# Patient Record
Sex: FEMALE | Race: AMERICAN INDIAN OR ALASKA NATIVE
[De-identification: names, ages, dates, MRNs, and addresses within clinical notes are randomized per-mention and may not be internally consistent; named-entity substitution may affect disease eponyms.]

---

## 2017-09-01 ENCOUNTER — HOSPITAL ENCOUNTER (EMERGENCY)
Dept: HOSPITAL 5 - ED | Age: 27
Discharge: HOME | End: 2017-09-01
Payer: MEDICAID

## 2017-09-01 VITALS — DIASTOLIC BLOOD PRESSURE: 77 MMHG | SYSTOLIC BLOOD PRESSURE: 111 MMHG

## 2017-09-01 DIAGNOSIS — K21.9: ICD-10-CM

## 2017-09-01 DIAGNOSIS — Z3A.39: ICD-10-CM

## 2017-09-01 DIAGNOSIS — K64.2: ICD-10-CM

## 2017-09-01 DIAGNOSIS — O22.43: Primary | ICD-10-CM

## 2017-09-01 NOTE — EMERGENCY DEPARTMENT REPORT
ED Female  HPI





- General


Chief complaint: Rectal Pain


Stated complaint: HEMORRIODS


Time Seen by Provider: 09/01/17 19:23


Source: patient


Mode of arrival: Ambulatory


Limitations: No Limitations





- History of Present Illness


Initial comments: 


This is a 26-year-old female at 39 weeks gestation presents to ED complaining 

of rectal pain x today.  She admits pain with sitting on her butt.  Patient 

states she started to feel the pain around 4 AM today.  She reports good fetal 

movement and no vaginal discharge, bleeding or leakage of fluids Patient states 

she has a history of hemorrhoids but has never had one this bad.  She denies 

any blood in the stool. patient states she had a bowel movement today.


She denies fevers/chills/abdominal pain or any other problem





Severity scale (0 -10): 9


Consistency: constant


Improves with: none





- Related Data


 Previous Rx's











 Medication  Instructions  Recorded  Last Taken  Type


 


Sulfamethoxazole/Trimethoprim 1 each PO BID #14 tablet 09/16/16 Unknown Rx





[Bactrim DS TAB]    


 


Acetaminophen [Tylenol] 500 mg PO Q6H #30 tablet 09/01/17 Unknown Rx


 


Cephalexin [Keflex] 500 mg PO BID #12 capsule 09/01/17 Unknown Rx


 


Docusate Sodium [Colace] 100 mg PO BID #30 capsule 09/01/17 Unknown Rx


 


Hydrocortisone [Anucort-HC SUPPOS] 25 mg MS BID #30 supp.rect 09/01/17 Unknown 

Rx











 Allergies











Allergy/AdvReac Type Severity Reaction Status Date / Time


 


No Known Allergies Allergy   Verified 09/16/16 10:25














ED Review of Systems


ROS: 


Stated complaint: HEMORRIODS


Other details as noted in HPI





Constitutional: denies: chills, fever


Eyes: denies: eye pain, eye discharge, vision change


ENT: denies: ear pain, throat pain


Respiratory: denies: cough, shortness of breath, wheezing


Cardiovascular: denies: chest pain, palpitations


Endocrine: no symptoms reported


Gastrointestinal: denies: abdominal pain, nausea, vomiting, diarrhea


Genitourinary: denies: urgency, dysuria, frequency, hematuria, discharge, 

dyspareunia


Musculoskeletal: denies: back pain, joint swelling, arthralgia


Skin: denies: rash, lesions


Neurological: denies: headache, weakness, numbness, paresthesias, confusion


Psychiatric: denies: anxiety, depression


Hematological/Lymphatic: denies: easy bleeding, easy bruising





ED Past Medical Hx





- Past Medical History


Hx Hypertension: Yes (1st baby)


Hx Heart Attack/AMI: No


Hx Congestive Heart Failure: No


Hx Diabetes: No


Hx Deep Vein Thrombosis: No


Hx GERD: Yes


Hx Liver Disease: No


Hx Renal Disease: No


Hx Sickle Cell Disease: No


Hx Seizures: No


Hx Asthma: No


Hx COPD: No


Hx HIV: No





- Surgical History


Additional Surgical History: D&C MAY 2016





- Social History


Smoking Status: Never Smoker


Substance Use Type: None





- Medications


Home Medications: 


 Home Medications











 Medication  Instructions  Recorded  Confirmed  Last Taken  Type


 


Sulfamethoxazole/Trimethoprim 1 each PO BID #14 tablet 09/16/16  Unknown Rx





[Bactrim DS TAB]     


 


Acetaminophen [Tylenol] 500 mg PO Q6H #30 tablet 09/01/17  Unknown Rx


 


Cephalexin [Keflex] 500 mg PO BID #12 capsule 09/01/17  Unknown Rx


 


Docusate Sodium [Colace] 100 mg PO BID #30 capsule 09/01/17  Unknown Rx


 


Hydrocortisone [Anucort-HC SUPPOS] 25 mg MS BID #30 supp.rect 09/01/17  Unknown 

Rx














ED Physical Exam





- General


Limitations: No Limitations


General appearance: alert, in no apparent distress





- Head


Head exam: Present: atraumatic, normocephalic





- Eye


Eye exam: Present: normal appearance





- ENT


ENT exam: Present: mucous membranes moist





- Neck


Neck exam: Present: normal inspection





- Respiratory


Respiratory exam: Present: normal lung sounds bilaterally.  Absent: respiratory 

distress





- Cardiovascular


Cardiovascular Exam: Present: regular rate, normal rhythm.  Absent: systolic 

murmur, diastolic murmur, rubs, gallop





- GI/Abdominal


GI/Abdominal exam: Present: soft, normal bowel sounds





- Rectal


Rectal exam: Present: hemorrhoids (external thrombosed hemorrhoid at 12 oclock 

measuring about 2-3 cm), mass, tenderness





- 


External exam: Present: normal external exam





- Expanded  Exam


  ** Expanded


External exam: Present: normal





- Extremities Exam


Extremities exam: Present: normal inspection





- Back Exam


Back exam: Present: normal inspection





- Neurological Exam


Neurological exam: Present: alert, oriented X3





- Psychiatric


Psychiatric exam: Present: normal affect, normal mood





- Skin


Skin exam: Present: warm, dry, intact, normal color.  Absent: rash





ED Course


 Vital Signs











  09/01/17





  17:43


 


Temperature 98.0 F


 


Pulse Rate 114 H


 


Respiratory 17





Rate 


 


Blood Pressure 115/80


 


O2 Sat by Pulse 100





Oximetry 














ED Medical Decision Making





- Medical Decision Making


26-year-old female presents with acute internal.  3 hemorrhage


ED course: Topical lidocaine was applied.


Patient positioned appropriately, 10cc lidocaine with epinephrine was used as a 

local anesthetic. 


#11 blade scalpel used for single incision. 


hemorrhoid was reducible after about 2ccs of blood passed. 


 Patient reports feeling much better. Procedure tolerated without 

complications. 


 Pt tolerated procedure well.


Discussed with patient to follow-up with gastroenterologist as referred.


Discussed the patient to follow-up with her OB/GYN as scheduled.


Discussed taken Colace for stool softening, and prevent infection and any 

required to further decrease the hemorrhoid








Critical care attestation.: 


If time is entered above; I have spent that time in minutes in the direct care 

of this critically ill patient, excluding procedure time.








ED Disposition


Clinical Impression: 


 Acute hemorrhoid





Hemorrhoid


Qualifiers:


 Hemorrhoid type: third degree Qualified Code(s): K64.2 - Third degree 

hemorrhoids





Disposition: DC-01 TO HOME OR SELFCARE


Is pt being admited?: No


Does the pt Need Aspirin: No


Condition: Stable


Instructions:  Hemorrhoids (ED)


Additional Instructions: 


Make sure to follow-up with a GI doctor.


Follow-up with your OB/GYN as scheduled


Particular medications as prescribed


Prescriptions: 


Acetaminophen [Tylenol] 500 mg PO Q6H #30 tablet


Cephalexin [Keflex] 500 mg PO BID #12 capsule


Docusate Sodium [Colace] 100 mg PO BID #30 capsule


Hydrocortisone [Anucort-HC SUPPOS] 25 mg MS BID #30 supp.rect


Referrals: 


PRIMARY CARE,MD [Primary Care Provider] - 3-5 Days


MANDI TAYLOR MD [Staff Physician] - 3-5 Days


WANDA KATZ MD [Staff Physician] - 3-5 Days


VANESSA TAPIA MD [Staff Physician] - 3-5 Days


Forms:  Accompanied Note, Work/School Release Form(ED)


Time of Disposition: 21:02

## 2019-12-12 ENCOUNTER — HOSPITAL ENCOUNTER (INPATIENT)
Dept: HOSPITAL 5 - TRG | Age: 29
LOS: 1 days | Discharge: HOME | End: 2019-12-13
Attending: OBSTETRICS & GYNECOLOGY | Admitting: OBSTETRICS & GYNECOLOGY
Payer: MEDICAID

## 2019-12-12 DIAGNOSIS — Z23: ICD-10-CM

## 2019-12-12 DIAGNOSIS — Z3A.40: ICD-10-CM

## 2019-12-12 DIAGNOSIS — Z79.899: ICD-10-CM

## 2019-12-12 LAB
HCT VFR BLD CALC: 34 % (ref 30.3–42.9)
HCT VFR BLD CALC: 40.6 % (ref 30.3–42.9)
HGB BLD-MCNC: 11.6 GM/DL (ref 10.1–14.3)
HGB BLD-MCNC: 13.8 GM/DL (ref 10.1–14.3)
MCHC RBC AUTO-ENTMCNC: 34 % (ref 30–34)
MCV RBC AUTO: 97 FL (ref 79–97)
PLATELET # BLD: 218 K/MM3 (ref 140–440)
RBC # BLD AUTO: 4.19 M/MM3 (ref 3.65–5.03)

## 2019-12-12 PROCEDURE — 86900 BLOOD TYPING SEROLOGIC ABO: CPT

## 2019-12-12 PROCEDURE — 86850 RBC ANTIBODY SCREEN: CPT

## 2019-12-12 PROCEDURE — 85018 HEMOGLOBIN: CPT

## 2019-12-12 PROCEDURE — 36415 COLL VENOUS BLD VENIPUNCTURE: CPT

## 2019-12-12 PROCEDURE — 85014 HEMATOCRIT: CPT

## 2019-12-12 PROCEDURE — 85027 COMPLETE CBC AUTOMATED: CPT

## 2019-12-12 PROCEDURE — 86901 BLOOD TYPING SEROLOGIC RH(D): CPT

## 2019-12-12 RX ADMIN — Medication SCH EACH: at 09:06

## 2019-12-12 RX ADMIN — Medication SCH MLS/HR: at 03:13

## 2019-12-12 RX ADMIN — FERROUS SULFATE TAB 325 MG (65 MG ELEMENTAL FE) SCH MG: 325 (65 FE) TAB at 09:05

## 2019-12-12 RX ADMIN — IBUPROFEN SCH MG: 600 TABLET, FILM COATED ORAL at 09:06

## 2019-12-12 RX ADMIN — HYDROCODONE BITARTRATE AND ACETAMINOPHEN PRN EACH: 5; 325 TABLET ORAL at 12:42

## 2019-12-12 RX ADMIN — IBUPROFEN SCH MG: 600 TABLET, FILM COATED ORAL at 17:56

## 2019-12-12 RX ADMIN — HYDROCODONE BITARTRATE AND ACETAMINOPHEN PRN EACH: 5; 325 TABLET ORAL at 22:44

## 2019-12-12 RX ADMIN — Medication SCH MLS/HR: at 04:06

## 2019-12-12 RX ADMIN — IBUPROFEN SCH MG: 600 TABLET, FILM COATED ORAL at 03:50

## 2019-12-12 RX ADMIN — DOCUSATE SODIUM SCH MG: 100 CAPSULE, LIQUID FILLED ORAL at 09:05

## 2019-12-12 NOTE — HISTORY AND PHYSICAL REPORT
History of Present Illness


Date of examination: 19


Chief complaint: 





Labor


History of present illness: 





Pt is a 28yo BF  EDC 19;  EGA 40 3/7 weeks presents to L&D complaining 

of RUC's q 3-5 mins.  She received prenatal care at [facility] 

since 14 weeks and  course has been unremarkable.  Prenatal records are

available and GBS is Positive.





Past History


Past Medical History: no pertinent history


Past Surgical History: no surgical history


Family/Genetic History: none


Social history: no significant social history, 





- Obstetrical History


Expected Date of Delivery: 19


Actual Gestation: 40 Week(s) 3 Day(s) 


: 5





Medications and Allergies


                                    Allergies











Allergy/AdvReac Type Severity Reaction Status Date / Time


 


No Known Allergies Allergy   Verified 17 20:58











                                Home Medications











 Medication  Instructions  Recorded  Confirmed  Last Taken  Type


 


Sulfamethoxazole/Trimethoprim 1 each PO BID #14 tablet 16  Unknown Rx





[Bactrim DS TAB]     


 


Acetaminophen [Tylenol] 500 mg PO Q6H #30 tablet 17  Unknown Rx


 


Cephalexin [Keflex] 500 mg PO BID #12 capsule 17  Unknown Rx


 


Docusate Sodium [Colace] 100 mg PO BID #30 capsule 17  Unknown Rx


 


Hydrocortisone [Anucort-HC SUPPOS] 25 mg SD BID #30 supp.rect 17  Unknown 

Rx


 


Ibuprofen [Motrin 600 MG tab] 600 mg PO Q8H PRN #30 tablet 17  Unknown Rx


 


Multivitamin with Iron 1 each PO DAILY #30 tablet 17  Unknown Rx





[Multivitamins with Iron]     


 


Cyclobenzaprine [Flexeril] 10 mg PO TID PRN #30 tablet 18  Unknown Rx


 


Menthol/Camphor [Tiger Balm 1 applicatio TP TID PRN #1 tube 18  Unknown Rx





Ointment]     


 


Naproxen [Naprosyn TAB] 500 mg PO BID PRN #30 tablet 18  Unknown Rx











Active Meds: 


Active Medications





Ephedrine Sulfate (Ephedrine Sulfate)  10 mg IV Q2M PRN


   PRN Reason: Hypotension


Fentanyl (Sublimaze)  100 mcg IV Q2H PRN


   PRN Reason: Labor Pain


Oxytocin/Sodium Chloride (Pitocin/Ns 20 Unit/1000ml Drip)  20 units in 1,000 mls

@ 125 mls/hr IV AS DIRECT ABNER


Lactated Ringer's (Lactated Ringers)  1,000 mls @ 125 mls/hr IV AS DIRECT ABNER


Ampicillin Sodium (Ampicillin/Ns 2 Gm/100 Ml)  2 gm in 100 mls @ 100 mls/hr IV 

ONCE ONE; Protocol


   Stop: 19 02:59


Ampicillin Sodium (Ampicillin/Ns 1 Gm/50 Ml)  1 gm in 50 mls @ 100 mls/hr IV 

Q4HR ABNER; Protocol


Mineral Oil (Mineral Oil)  30 ml PO QHS PRN


   PRN Reason: Constipation


Terbutaline Sulfate (Brethine)  0.25 mg SUB-Q ONCE PRN


   PRN Reason: Hyperstimulation/Hypertonicity


Terbutaline Sulfate (Brethine)  0.25 mg IVP ONCE PRN


   PRN Reason: Hyperstimulation/Hypertonicity











Review of Systems


All systems: negative





- Vital Signs


Vital signs: 


                                   Vital Signs











Pulse BP


 


 105 H  137/90 


 


 19 01:48  19 01:48








                                        











Temp Pulse Resp BP Pulse Ox


 


    93 H     137/78    


 


    19 02:39     19 02:39   














- Physical Exam


Breasts: Positive: deferred


Cardiovascular: Regular rate


Lungs: Positive: Clear to auscultation


Abdomen: Positive: normal appearance


Genitourinary (Female): Positive: normal external genitalia


Vagina: Positive: normal moisture


Uterus: Positive: enlarged


Extremities: Positive: normal





- Obstetrical


FHR: category 1


Uterine Contraction Monitor Mode: External


Cervical Dilatation: 10


Cervical Effacement Percentage: 100


Fetal station: +2


Uterine Contraction Pattern: Regular


Uterine Tone Measurement Phase: Contraction


Uterine Contraction Intensity: Strong/Firm





Results


Result Diagrams: 


                                 19 02:10





                              Abnormal lab results











  19 Range/Units





  02:10 


 


MCH  33 H  (28-32)  pg








All other labs normal.








Assessment and Plan





- Patient Problems


(1) 40 weeks gestation of pregnancy


Onset Date: 19   Current Visit: No   Status: Acute   


Plan to address problem: 


A:  IUP @ 40 3/7 weeks in labor


      +GBS





 P:  Admit to L&D for expectant vaginal delivery


      IV Ampicillin








(2) Active labor at term


Onset Date: 19   Current Visit: No   Status: Acute

## 2019-12-12 NOTE — PROCEDURE NOTE
OB Delivery Note





- Delivery


Date of Delivery: 19


Surgeon: MARCELO VIEYRA


Estimated blood loss: 100cc





- Vaginal


Delivery presentation: vertex


Delivery position: OA


Intrapartum events: precipitous labor- <3hr


Delivery induction: none


Delivery monitor: external FHT, external uterine


Route of delivery: 


Delivery placenta: spontaneous


Delivery cord: 3 umbilical vessels


Episiotomy: none


Delivery laceration: none


Anesthesia: none


Delivery comments: 





Infant delivered OA and placed on Mom's chest for skin-to-skin bonding and 

delayed cord clamping, cut by Dad





- Infant


  ** A


Apgar at 1 minute: 8


Apgar at 5 minutes: 9


Infant Gender: Female (3361gms)

## 2019-12-13 VITALS — DIASTOLIC BLOOD PRESSURE: 80 MMHG | SYSTOLIC BLOOD PRESSURE: 126 MMHG

## 2019-12-13 PROCEDURE — 3E0234Z INTRODUCTION OF SERUM, TOXOID AND VACCINE INTO MUSCLE, PERCUTANEOUS APPROACH: ICD-10-PCS | Performed by: OBSTETRICS & GYNECOLOGY

## 2019-12-13 PROCEDURE — 3E0134Z INTRODUCTION OF SERUM, TOXOID AND VACCINE INTO SUBCUTANEOUS TISSUE, PERCUTANEOUS APPROACH: ICD-10-PCS | Performed by: OBSTETRICS & GYNECOLOGY

## 2019-12-13 RX ADMIN — FERROUS SULFATE TAB 325 MG (65 MG ELEMENTAL FE) SCH MG: 325 (65 FE) TAB at 10:57

## 2019-12-13 RX ADMIN — Medication SCH EACH: at 10:56

## 2019-12-13 RX ADMIN — DOCUSATE SODIUM SCH: 100 CAPSULE, LIQUID FILLED ORAL at 11:00

## 2019-12-13 RX ADMIN — DOCUSATE SODIUM SCH MG: 100 CAPSULE, LIQUID FILLED ORAL at 10:57

## 2019-12-13 RX ADMIN — IBUPROFEN SCH: 600 TABLET, FILM COATED ORAL at 11:00

## 2019-12-13 RX ADMIN — FERROUS SULFATE TAB 325 MG (65 MG ELEMENTAL FE) SCH: 325 (65 FE) TAB at 11:00

## 2019-12-13 NOTE — DISCHARGE SUMMARY
Providers





- Providers


Date of Admission: 


19 02:01





Date of discharge: 19


Attending physician: 


MARCELO VIEYRA





Primary care physician: 


MARCELO VIEYRA








Hospitalization


Reason for admission: active labor, IUP at term


Delivery: 


Episiotomy: none


Laceration: none


Other postpartum procedures: none


Postpartum complications: none


Discharge diagnosis: IUP at term delivered


[place] baby: female


Hospital course: 





                                Laboratory Tests











  19





  02:10 02:10 15:16


 


WBC  7.9  


 


RBC  4.19  


 


Hgb  13.8   11.6


 


Hct  40.6   34.0  D


 


MCV  97  


 


MCH  33 H  


 


MCHC  34  


 


RDW  13.7  


 


Plt Count  218  


 


Blood Type   O POSITIVE 


 


Antibody Screen   Negative 











Condition at discharge: Good


Disposition: DC-01 TO HOME OR SELFCARE





- Discharge Diagnoses


(1) 40 weeks gestation of pregnancy


Status: Resolved   





(2) Active labor at term


Status: Resolved   





(3)  (normal spontaneous vaginal delivery)


Status: Resolved   





Plan





- Discharge Medications


Prescriptions: 


Ferrous Sulfate [Feosol 325 MG tab] 325 mg PO BID #60 tablet


Ibuprofen [Motrin 600 MG tab] 600 mg PO Q6H #30 tablet


Prenatal Vit-Fe Fumar-FA [Prenatal Vitamin] 1 each PO QDAY #30 tablet





- Provider Discharge Summary


Activity: routine, no sex for 6 weeks, no heavy lifting 4 weeks, no strenuous ex

ercise


Diet: routine


Instructions: routine


Additional instructions: 


[]  Smoking cessation referral if applicable(refer to patient education folder 

for contact #)


[]  Refer to [facility] Booklet








Call your doctor immediately for:


* Fever > 100.5


* Heavy vaginal bleeding ( >1 pad per hour)


* Severe persistent headache


* Shortness of breath


* Reddened, hot, painful area to leg or breast


* Drainage or odor from incision.





* Keep incision clean and dry at all times and follow doctor's instructions 

regarding bathing/showering











- Follow up plan


Follow up: 


MARCELO VIEYRA MD [Primary Care Provider] - 6 Weeks


CHUY FARRELL NP [Referring] - 6 Weeks

## 2019-12-13 NOTE — PROGRESS NOTE
Assessment and Plan





- Patient Problems


(1) 40 weeks gestation of pregnancy


Onset Date: 19   Current Visit: No   Status: Resolved   





(2) Active labor at term


Onset Date: 19   Current Visit: No   Status: Resolved   





(3)  (normal spontaneous vaginal delivery)


Onset Date: 19   Current Visit: No   Status: Resolved   


Plan to address problem: 


A:  S/P  - PPD #1  Doing well


      Asymptomatic anemia - stable





P:   May go home today.








Subjective





- Subjective


Date of service: 19


Principal diagnosis: s/p  - PPD #1


Interval history: 





Pt is feeling well without complaints.  Bleeding improved. Wants to go home.


Patient reports: appetite normal, voiding normally, pain well controlled, 

flatus, ambulating normally, no dizzy ambulation, no nauseated


Liz[place]: doing well, nursing well





Objective





- Vital Signs


Latest vital signs: 


                                   Vital Signs











  Temp Pulse Resp BP


 


 19 08:32  98.2 F  79  18  125/85


 


 19 00:00  98.4 F  68  16  114/78


 


 19 16:55  99 F  82  20  120/79








                                Intake and Output











 19





 22:59 06:59 14:59


 


Intake Total 540  120


 


Balance 540  120


 


Intake:   


 


  Oral 240  120


 


  Intake, Free Water 300  


 


Other:   


 


  Total, Intake Amount 240  120


 


  # Voids   


 


    Void 1 1 1














- Exam


Breasts: Present: deferred


Abdomen: Present: normal appearance, soft


Uterus: Present: normal, firm, fundal height below umbilicus


Extremities: Present: normal





- Labs


Labs: 


                                Laboratory Tests











  19





  02:10 02:10 15:16


 


WBC  7.9  


 


RBC  4.19  


 


Hgb  13.8   11.6


 


Hct  40.6   34.0  D


 


MCV  97  


 


MCH  33 H  


 


MCHC  34  


 


RDW  13.7  


 


Plt Count  218  


 


Blood Type   O POSITIVE 


 


Antibody Screen   Negative